# Patient Record
Sex: FEMALE | Race: WHITE | NOT HISPANIC OR LATINO | URBAN - METROPOLITAN AREA
[De-identification: names, ages, dates, MRNs, and addresses within clinical notes are randomized per-mention and may not be internally consistent; named-entity substitution may affect disease eponyms.]

---

## 2022-03-18 ENCOUNTER — EMERGENCY (EMERGENCY)
Facility: HOSPITAL | Age: 42
LOS: 1 days | Discharge: HOME | End: 2022-03-18
Admitting: EMERGENCY MEDICINE
Payer: COMMERCIAL

## 2022-03-18 VITALS
HEART RATE: 100 BPM | RESPIRATION RATE: 19 BRPM | DIASTOLIC BLOOD PRESSURE: 66 MMHG | TEMPERATURE: 98 F | SYSTOLIC BLOOD PRESSURE: 113 MMHG | OXYGEN SATURATION: 98 %

## 2022-03-18 VITALS
TEMPERATURE: 98 F | HEIGHT: 70 IN | HEART RATE: 109 BPM | SYSTOLIC BLOOD PRESSURE: 113 MMHG | OXYGEN SATURATION: 100 % | DIASTOLIC BLOOD PRESSURE: 72 MMHG | WEIGHT: 134.92 LBS | RESPIRATION RATE: 18 BRPM

## 2022-03-18 DIAGNOSIS — S01.111A LACERATION WITHOUT FOREIGN BODY OF RIGHT EYELID AND PERIOCULAR AREA, INITIAL ENCOUNTER: ICD-10-CM

## 2022-03-18 DIAGNOSIS — S09.90XA UNSPECIFIED INJURY OF HEAD, INITIAL ENCOUNTER: ICD-10-CM

## 2022-03-18 DIAGNOSIS — R41.82 ALTERED MENTAL STATUS, UNSPECIFIED: ICD-10-CM
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
## 2022-03-18 PROCEDURE — 70486 CT MAXILLOFACIAL W/O DYE: CPT | Mod: 26

## 2022-03-18 PROCEDURE — 99285 EMERGENCY DEPT VISIT HI MDM: CPT | Mod: 25

## 2022-03-18 PROCEDURE — 99053 MED SERV 10PM-8AM 24 HR FAC: CPT

## 2022-03-18 PROCEDURE — 70450 CT HEAD/BRAIN W/O DYE: CPT | Mod: 26

## 2022-03-18 PROCEDURE — 12011 RPR F/E/E/N/L/M 2.5 CM/<: CPT

## 2022-03-18 NOTE — ED PROVIDER NOTE - CARE PLAN
1 Principal Discharge DX:	Facial laceration  Secondary Diagnosis:	Head trauma  Secondary Diagnosis:	Fall

## 2022-03-18 NOTE — ED ADULT TRIAGE NOTE - CHIEF COMPLAINT QUOTE
Pt BIBA for AMS. Pt states she drank alcohol, denies drug use. Pt had mechanical fall resulting in R eyebrow laceration. LOC unknown. Pt denies blood thinner use. Pt states last tetanus was 8 years ago.

## 2022-03-18 NOTE — ED PROVIDER NOTE - PHYSICAL EXAMINATION
Gen - WDWN F, no acute distress  Skin - warm, dry, 1.5cm stellate laceration to the R lateral eyebrow region with active bleeding and +abraded skin, no FB or bony exposure noted   HEENT - AT/NC, PERRL, mild conjunctival injection, pupils 3mm b/l, TM intact with no hemotympanium b/l, no facial contusion or periorbital ecchymosis, o/p clear, uvula midline, airway patent, neck supple with no step off or midline tenderness, FROM   CV - S1S2, R/R/R  Resp - respiration non-labored, CTAB, symmetric bs b/l, no r/r/w  GI - NABS, soft, ND, NT, no rebound or guarding, no CVAT b/l  MS - w/w/p, no c/c/e, calves supple and NT  Neuro - Alert and awake and oriented x 3, normal speech, ambulatory with steady gait, no focal deficits

## 2022-03-18 NOTE — ED PROVIDER NOTE - CLINICAL SUMMARY MEDICAL DECISION MAKING FREE TEXT BOX
pt with laceration to the R eyebrow region s/p mechanical fall and alcohol intoxication, wound copiously irrigated under high pressure and repaired at bedside, CTH/MF with no acute ICH/fx, NV intact pre and post lac repair, wound care instructions provided, monitored in the ED with improvement in mental status, AFVSS, counseling and wound care instructions provided, f/u with PMD or plastics, pt verbalized understanding

## 2022-03-18 NOTE — ED PROVIDER NOTE - CARE PROVIDER_API CALL
Sophie Mckeon)  Plastic Surgery; Surgery  [address], [address]  [place] [address]  Phone: [phone]  Fax: [phone]  Follow Up Time:

## 2022-03-18 NOTE — ED PROVIDER NOTE - PATIENT PORTAL LINK FT
You can access the FollowMyHealth Patient Portal offered by [facility] by registering at the following website: [email or web address]. By joining "[employer]"’s FollowMyHealth portal, you will also be able to view your health information using other applications (apps) compatible with our system.

## 2022-03-18 NOTE — ED PROVIDER NOTE - OBJECTIVE STATEMENT
42 yo F with no known PMHx, last tetanus 8 yrs ago, not on any AC, BIBA for facial laceration, HA s/p fall tonight.  Pt admits to drinking alcohol tonight, tripped and fell. Landed on her face, sustained laceration to her R eyebrow region from impact from her eyeglasses.  Noted possible LOC subsequently. Denies prodromes, dizziness, SOB, CP, palpitations, N/V, change in ROM/sensation, abdominal/back/neck pain, focal weakness, change in vision/mental status/speech, paresthesia, and malaise. Pt is ambulatory s/p fall.

## 2022-03-18 NOTE — ED ADULT NURSE NOTE - NSIMPLEMENTINTERV_GEN_ALL_ED
Implemented All Fall Risk Interventions:  [place] to call system. Call bell, personal items and telephone within reach. Instruct patient to call for assistance. Room bathroom lighting operational. Non-slip footwear when patient is off stretcher. Physically safe environment: no spills, clutter or unnecessary equipment. Stretcher in lowest position, wheels locked, appropriate side rails in place. Provide visual cue, wrist band, yellow gown, etc. Monitor gait and stability. Monitor for mental status changes and reorient to person, place, and time. Review medications for side effects contributing to fall risk. Reinforce activity limits and safety measures with patient and family.

## 2022-03-18 NOTE — ED ADULT NURSE NOTE - OBJECTIVE STATEMENT
41y female BIBEMS for alcohol intoxication and right sided forehead laceration. Pt had a mechanical fall, denies LOC.  Laceration noted to right side of head, bleeding controlled.